# Patient Record
Sex: MALE | Race: WHITE | Employment: FULL TIME | URBAN - METROPOLITAN AREA
[De-identification: names, ages, dates, MRNs, and addresses within clinical notes are randomized per-mention and may not be internally consistent; named-entity substitution may affect disease eponyms.]

---

## 2018-06-03 ENCOUNTER — OFFICE VISIT (OUTPATIENT)
Dept: FAMILY MEDICINE CLINIC | Facility: CLINIC | Age: 28
End: 2018-06-03

## 2018-06-03 VITALS
SYSTOLIC BLOOD PRESSURE: 112 MMHG | TEMPERATURE: 98 F | RESPIRATION RATE: 20 BRPM | WEIGHT: 211.19 LBS | OXYGEN SATURATION: 98 % | BODY MASS INDEX: 29.56 KG/M2 | HEART RATE: 91 BPM | DIASTOLIC BLOOD PRESSURE: 76 MMHG | HEIGHT: 71 IN

## 2018-06-03 DIAGNOSIS — J02.9 SORE THROAT: ICD-10-CM

## 2018-06-03 DIAGNOSIS — J01.40 ACUTE NON-RECURRENT PANSINUSITIS: ICD-10-CM

## 2018-06-03 DIAGNOSIS — J03.90 TONSILLITIS: Primary | ICD-10-CM

## 2018-06-03 PROCEDURE — 87880 STREP A ASSAY W/OPTIC: CPT | Performed by: PHYSICIAN ASSISTANT

## 2018-06-03 PROCEDURE — 99213 OFFICE O/P EST LOW 20 MIN: CPT | Performed by: PHYSICIAN ASSISTANT

## 2018-06-03 RX ORDER — AZITHROMYCIN 250 MG/1
TABLET, FILM COATED ORAL
Qty: 6 TABLET | Refills: 0 | Status: SHIPPED | OUTPATIENT
Start: 2018-06-03 | End: 2019-06-24 | Stop reason: ALTCHOICE

## 2018-06-03 RX ORDER — METHYLPREDNISOLONE 4 MG/1
TABLET ORAL
Qty: 1 PACKAGE | Refills: 0 | Status: SHIPPED | OUTPATIENT
Start: 2018-06-03 | End: 2019-06-24 | Stop reason: ALTCHOICE

## 2018-06-03 NOTE — PROGRESS NOTES
CHIEF COMPLAINT:   Patient presents with:  Sore Throat: sinus congestion, chest congestion, headache, post nasal drip, brown mucus x6days      HPI:   William Simpson is a 24year old male who presents for URI sxs for  About 1 week.  Patient explains las EARS: TM's not erythematous, no bulging, no retraction, no fluid, bony landmarks intact. EACs WNL BL. NOSE: Nostrils patent, no nasal discharge, nasal mucosa inflamed   THROAT: oral mucosa pink, moist. Posterior pharynx  erythematous and injected.  NO e Acute Bacterial Rhinosinusitis (ABRS)    Acute bacterial rhinosinusitis (ABRS) is an infection of your nasal cavity and sinuses. It’s caused by bacteria. Acute means that you’ve had symptoms for less than 4 weeks, but possibly up to 12 weeks.   Nathan Carmichael · Nasal corticosteroid medicine. Drops or spray used in the nose can lessen swelling and congestion. · Over-the-counter pain medicine. This is to lessen sinus pain and pressure. · Nasal decongestant medicine. Spray or drops may help to lessen congestion.

## 2018-06-03 NOTE — PATIENT INSTRUCTIONS
-Cool mist humidifier at night  -Warm tea with honey  -Sudafed  -Flonase  -Tylenol as needed  - Go to ER with any worsening symptoms  -Follow up with PCP with persistent symptoms.              Acute Bacterial Rhinosinusitis (ABRS)    Acute bacterial rhinosi Treatment may include:  · Antibiotic medicine. This is for symptoms that last for at least 10 to 14 days. · Nasal corticosteroid medicine. Drops or spray used in the nose can lessen swelling and congestion. · Over-the-counter pain medicine.  This is to [employer]